# Patient Record
Sex: FEMALE | Race: BLACK OR AFRICAN AMERICAN | NOT HISPANIC OR LATINO | Employment: OTHER | URBAN - METROPOLITAN AREA
[De-identification: names, ages, dates, MRNs, and addresses within clinical notes are randomized per-mention and may not be internally consistent; named-entity substitution may affect disease eponyms.]

---

## 2017-05-03 DIAGNOSIS — M25.561 ACUTE PAIN OF RIGHT KNEE: Primary | ICD-10-CM

## 2017-05-04 ENCOUNTER — OFFICE VISIT (OUTPATIENT)
Dept: ORTHOPEDICS | Facility: CLINIC | Age: 52
End: 2017-05-04
Payer: COMMERCIAL

## 2017-05-04 ENCOUNTER — HOSPITAL ENCOUNTER (OUTPATIENT)
Dept: RADIOLOGY | Facility: HOSPITAL | Age: 52
Discharge: HOME | End: 2017-05-04
Attending: ORTHOPAEDIC SURGERY
Payer: COMMERCIAL

## 2017-05-04 VITALS
BODY MASS INDEX: 46.07 KG/M2 | HEIGHT: 61 IN | SYSTOLIC BLOOD PRESSURE: 96 MMHG | HEART RATE: 95 BPM | DIASTOLIC BLOOD PRESSURE: 50 MMHG | WEIGHT: 244 LBS

## 2017-05-04 DIAGNOSIS — M25.561 ACUTE PAIN OF RIGHT KNEE: ICD-10-CM

## 2017-05-04 DIAGNOSIS — M25.561 ACUTE PAIN OF RIGHT KNEE: Primary | ICD-10-CM

## 2017-05-04 DIAGNOSIS — R63.4 WEIGHT LOSS: Primary | ICD-10-CM

## 2017-05-04 PROCEDURE — 73562 X-RAY EXAM OF KNEE 3: CPT | Mod: 26,RT,, | Performed by: RADIOLOGY

## 2017-05-04 PROCEDURE — 99999 PR PBB SHADOW E&M-EST. PATIENT-LVL III: CPT | Mod: PBBFAC,,, | Performed by: ORTHOPAEDIC SURGERY

## 2017-05-04 PROCEDURE — 1160F RVW MEDS BY RX/DR IN RCRD: CPT | Mod: S$GLB,,, | Performed by: ORTHOPAEDIC SURGERY

## 2017-05-04 PROCEDURE — 73560 X-RAY EXAM OF KNEE 1 OR 2: CPT | Mod: TC,PO,LT

## 2017-05-04 PROCEDURE — 73560 X-RAY EXAM OF KNEE 1 OR 2: CPT | Mod: 26,LT,, | Performed by: RADIOLOGY

## 2017-05-04 PROCEDURE — 99204 OFFICE O/P NEW MOD 45 MIN: CPT | Mod: S$GLB,,, | Performed by: ORTHOPAEDIC SURGERY

## 2017-05-04 RX ORDER — FENOFIBRATE 145 MG/1
125 TABLET, FILM COATED ORAL
COMMUNITY

## 2017-05-04 RX ORDER — PANTOPRAZOLE SODIUM 40 MG/1
40 TABLET, DELAYED RELEASE ORAL
COMMUNITY

## 2017-05-04 RX ORDER — CLONAZEPAM 0.5 MG/1
0.5 TABLET ORAL
COMMUNITY
End: 2022-03-15

## 2017-05-04 RX ORDER — PREGABALIN 100 MG/1
100 CAPSULE ORAL
COMMUNITY
End: 2022-03-15

## 2017-05-04 RX ORDER — AMLODIPINE AND VALSARTAN 10; 320 MG/1; MG/1
1 TABLET ORAL
COMMUNITY
End: 2022-03-15

## 2017-05-04 RX ORDER — FUROSEMIDE 20 MG/1
20 TABLET ORAL
COMMUNITY

## 2017-05-04 RX ORDER — AMOXICILLIN 500 MG
2 CAPSULE ORAL
COMMUNITY
End: 2022-03-15

## 2017-05-04 RX ORDER — ASPIRIN 81 MG/1
81 TABLET ORAL
COMMUNITY

## 2017-05-04 RX ORDER — METOPROLOL SUCCINATE 100 MG/1
100 TABLET, EXTENDED RELEASE ORAL
COMMUNITY

## 2017-05-04 RX ORDER — ERGOCALCIFEROL 1.25 MG/1
50000 CAPSULE ORAL
COMMUNITY

## 2017-05-04 RX ORDER — NITROGLYCERIN 0.4 MG/1
0.4 TABLET SUBLINGUAL
COMMUNITY

## 2017-05-04 RX ORDER — LUBIPROSTONE 24 UG/1
24 CAPSULE ORAL
COMMUNITY

## 2017-05-04 RX ORDER — CLOPIDOGREL BISULFATE 75 MG/1
75 TABLET ORAL
COMMUNITY
End: 2022-03-15

## 2017-05-04 NOTE — LETTER
May 4, 2017      Nav Arevalo MD  [address] [facility]  [place] [address]           [facility]  [address]  [place] [address]  Phone: [phone]  Fax: [phone]          Patient: Miriam Chacon   MR Number: [id number]   YOB: 1965   Date of Visit: 5/4/2017       Dear Dr. Nav Arevalo:    Thank you for referring Miriam Chacon to me for evaluation. Attached you will find relevant portions of my assessment and plan of care.    If you have questions, please do not hesitate to call me. I look forward to following Miriam Chacon along with you.    Sincerely,    Miguel Arriaga Sr., MD    Enclosure  CC:  No Recipients    If you would like to receive this communication electronically, please contact [email or web address] or [phone] to request more information on [employer] access.    For providers and/or their staff who would like to refer a patient to [facility], please contact us through our one-stop-shop provider referral line, [facility], at [phone].    If you feel you have received this communication in error or would no longer like to receive these types of communications, please e-mail [email or web address]

## 2017-05-04 NOTE — PROGRESS NOTES
CC: This is a 51-year-old female that complains of right knee pain.    HPI: She states that she injured her right knee in 2012.  The patient underwent an ORIF of a tibial plateau fracture.  In 2015 she underwent a right total knee replacement.  The patient does have a history of heart disease.  She did suffer 1 heart attack.  The patient was on Plavix while she was involved in the first accident.  She continued the blood thinners following the total knee replacement.  She has been evaluated by vascular surgeon.    PMH:  History reviewed. No pertinent past medical history.    PSH:  History reviewed. No pertinent surgical history.    Family Hx:  History reviewed. No pertinent family history.    Allergy:  Review of patient's allergies indicates:  No Known Allergies    Medication:    Current Outpatient Prescriptions:     amlodipine-valsartan (EXFORGE)  mg per tablet, Take 1 tablet by mouth., Disp: , Rfl:     aspirin (ECOTRIN) 81 MG EC tablet, Take 81 mg by mouth., Disp: , Rfl:     clonazePAM (KLONOPIN) 0.5 MG tablet, Take 0.5 mg by mouth., Disp: , Rfl:     clopidogrel (PLAVIX) 75 mg tablet, Take 75 mg by mouth., Disp: , Rfl:     ergocalciferol (ERGOCALCIFEROL) 50,000 unit Cap, Take 50,000 Units by mouth., Disp: , Rfl:     fenofibrate (TRICOR) 145 MG tablet, Take 125 mg by mouth., Disp: , Rfl:     fish oil-omega-3 fatty acids 300-1,000 mg capsule, Take 2 g by mouth., Disp: , Rfl:     furosemide (LASIX) 20 MG tablet, Take 20 mg by mouth., Disp: , Rfl:     insulin regular 100 unit/mL Inj injection, Inject 26.25 Units into the skin., Disp: , Rfl:     lubiprostone (AMITIZA) 24 MCG Cap, Take 24 mcg by mouth., Disp: , Rfl:     metoprolol succinate (TOPROL-XL) 100 MG 24 hr tablet, Take 100 mg by mouth., Disp: , Rfl:     nitroGLYCERIN (NITROSTAT) 0.4 MG SL tablet, Place 0.4 mg under the tongue., Disp: , Rfl:     pantoprazole (PROTONIX) 40 MG tablet, Take 40 mg by mouth., Disp: , Rfl:     pregabalin (LYRICA)  "100 MG capsule, Take 100 mg by mouth., Disp: , Rfl:     Social History:    Social History     Social History    Marital status:      Spouse name: N/A    Number of children: N/A    Years of education: N/A     Occupational History    Not on file.     Social History Main Topics    Smoking status: Never Smoker    Smokeless tobacco: Never Used    Alcohol use No    Drug use: Not on file    Sexual activity: Not on file     Other Topics Concern    Not on file     Social History Narrative    No narrative on file       Vitals:   BP (!) 96/50  Pulse 95  Ht 5' 1" (1.549 m)  Wt 110.7 kg (244 lb)  BMI 46.1 kg/m2     ROS:  GENERAL: No fever, chills, fatigability or weight loss.  SKIN: No rashes, itching or changes in color or texture of skin.  HEAD: No headaches or recent head trauma.  EYES: Visual acuity fine. No photophobia, ocular pain or diplopia.  EARS: Denies ear pain, discharge or vertigo.  NOSE: No loss of smell, no epistaxis or postnasal drip.  MOUTH & THROAT: No hoarseness or change in voice. No excessive gum bleeding.  NODES: Denies swollen glands.  CHEST: Denies LEONARD, cyanosis, wheezing, cough and sputum production.  CARDIOVASCULAR: Denies chest pain, PND, orthopnea or reduced exercise tolerance.  ABDOMEN: Appetite fine. No weight loss. Denies diarrhea, abdominal pain, hematemesis or blood in stool.  URINARY: No flank pain, dysuria or hematuria.  PERIPHERAL VASCULAR: No claudication or cyanosis.  NEUROLOGIC: No history of seizures, paralysis, alteration of gait or coordination.  MUSCULOSKELETAL: See HPI    PE:  APPEARANCE: Well nourished, well developed, in no acute distress.   HEAD: Normocephalic, atraumatic.  EYES: PERRL. EOMI.   EARS: TM's intact. Light reflex normal. No retraction or perforation.   NOSE: Mucosa pink. Airway clear.  MOUTH & THROAT: No tonsillar enlargement. No pharyngeal erythema or exudate. No stridor.  NECK: Supple.   NODES: No cervical, axillary or inguinal lymph node " enlargement.  CHEST: Lungs clear to auscultation.  CARDIOVASCULAR: Normal S1, S2. No rubs, murmurs or gallops.  ABDOMEN: Bowel sounds normal. Not distended. Soft. No tenderness or masses.  NEUROLOGIC: Cranial Nerves: II-XII grossly intact, also see MUSCULOSKELETAL  MUSCULOSKELETAL:              Right Knee   -2 plus dorsalis pedis and posterior tibial artery pulses, light touch intact Right lower extremity.  All digits are warm. No erythema, no warmth, no drainage, moderate swelling, no significant tenderness.  Less than 2 seconds capillary refill all digit diffuse pitting edema  Assessment:           Diagnosis:              1.right knee pain                 Diagnostic Studies  MRI-No  X-Ray-No  EMG/NCV-No  Arthrogram-No  Bone Scan-No  CT Scan-No  Doppler-No  ESR-No  CRP-No  CBC with Diff-No   Rheumatoid/Arthritis Panel-No      Plan:                                                 1. PT-yes                                                 2.OT-no                                          3.NSAID-yes                                        4. Narcotics-no                                     5. Wound care-Yes                                 6. Rest-yes                                           7. Surgery-no                                         8. KALEIGH Hose-no                                    9. Anticoagulation therapy-no               10. Elevation-no                                     11. Crutches-no                                    12. Walker-no             13. Cane no                        14. Referral-no                                     15.Injection-no                            16. Splint   /    Cast   /   Cast Shoe-Yes  referral to bariatric            17. RICE-none            18. Follow up-  3 month

## 2017-05-05 NOTE — PATIENT INSTRUCTIONS
ACE Wrap  Minor muscle or joint injuries are often treated with an elastic bandage. The bandage provides support and compression to the injured area. An elastic bandage is a stretchy, rolled bandage. Elastic bandages range in width from 2 to 6 inches. They can be used for a variety of injuries. The bandages are often called ACE bandages, after the most common brand name.  If used correctly, elastic bandages help control swelling and ease pain. An elastic bandage is also a good reminder not to overuse the injured area. However, elastic bandages do not provide a lot of support and will not prevent reinjury.  Home care    To apply an elastic bandage:  · Check the skin before wrapping the injury. It should be clean, dry, and free of drainage.  · Start wrapping below the injury and work your way toward the body. For an ankle sprain, start wrapping around the foot and work up toward the calf. This will help control swelling.  · Overlap the edges of the bandage so it stays snuggly in place.  · Wrap the bandage firmly, but not too tightly. A tight bandage can increase swelling on either end of the bandage. Make sure the bandage is wrinkle free.  · Leave fingers and toes exposed.  · Secure ends of the bandage (even self-sticking ones) with clips or tape.  · Check frequently to ensure adequate circulation, especially in the fingers and toes. Loosen the bandage if there is local swelling, numbness, tingling, discomfort, coldness, or discoloration (skin pale or bluish in color).  · Rewrap the bandage as needed during the day. Reroll the bandage as you unwind it.  Continue using the elastic bandage until the pain and swelling are gone or as your healthcare provider advises.  If you have been told to ice the area, the ice can be secured in place with the elastic bandage. Wrap the ice pack with a thin towel to protect the skin. Do not put ice or an ice pack directly on the skin.  Ice the area for no more than 20 minutes at a  time.    Follow-up care  Follow up with your healthcare provider, as advised.  When to seek medical advice  Call your healthcare provider for any of the following:  · Pain and swelling that doesn't get better or gets worse  · Trouble moving injured area  · Skin discoloration, numbness, or tingling that doesnt go away after bandage is removed  Date Last Reviewed: 9/13/2015  © 7076-8057 [employer]. [address] [place] [address], [place] [address]. All rights reserved. This information is not intended as a substitute for professional medical care. Always follow your healthcare professional's instructions.

## 2017-06-30 ENCOUNTER — OFFICE VISIT (OUTPATIENT)
Dept: ORTHOPEDICS | Facility: CLINIC | Age: 52
End: 2017-06-30
Payer: COMMERCIAL

## 2017-06-30 VITALS
HEIGHT: 61 IN | SYSTOLIC BLOOD PRESSURE: 118 MMHG | BODY MASS INDEX: 51.73 KG/M2 | WEIGHT: 274 LBS | DIASTOLIC BLOOD PRESSURE: 70 MMHG

## 2017-06-30 DIAGNOSIS — G89.29 CHRONIC PAIN OF RIGHT KNEE: Primary | ICD-10-CM

## 2017-06-30 DIAGNOSIS — M17.12 ARTHRITIS OF LEFT KNEE: Primary | ICD-10-CM

## 2017-06-30 DIAGNOSIS — M25.462 EFFUSION OF LEFT KNEE: ICD-10-CM

## 2017-06-30 DIAGNOSIS — M65.9 SYNOVITIS OF LEFT KNEE: ICD-10-CM

## 2017-06-30 DIAGNOSIS — M25.561 CHRONIC PAIN OF RIGHT KNEE: Primary | ICD-10-CM

## 2017-06-30 PROBLEM — M65.962 SYNOVITIS OF LEFT KNEE: Status: ACTIVE | Noted: 2017-06-30

## 2017-06-30 PROCEDURE — 99214 OFFICE O/P EST MOD 30 MIN: CPT | Mod: S$GLB,,, | Performed by: ORTHOPAEDIC SURGERY

## 2017-06-30 PROCEDURE — 99999 PR PBB SHADOW E&M-EST. PATIENT-LVL III: CPT | Mod: PBBFAC,,, | Performed by: ORTHOPAEDIC SURGERY

## 2017-06-30 NOTE — PROGRESS NOTES
CC:This is a 51-year-old female that complains of left knee pain.    HPI:The patient states that she had a sudden onset of left knee pain that is rated a 7 out of 10.  She denies any trauma.The patient does have a history of right knee replacement as well as ORIF of the tibial plateau on the right.  She does favor her right lower extremity and place more weight on the left.    PMH:  No past medical history on file.    PSH:  No past surgical history on file.    Family Hx:  No family history on file.    Allergy:  Review of patient's allergies indicates:  No Known Allergies    Medication:    Current Outpatient Prescriptions:     amlodipine-valsartan (EXFORGE)  mg per tablet, Take 1 tablet by mouth., Disp: , Rfl:     aspirin (ECOTRIN) 81 MG EC tablet, Take 81 mg by mouth., Disp: , Rfl:     clonazePAM (KLONOPIN) 0.5 MG tablet, Take 0.5 mg by mouth., Disp: , Rfl:     clopidogrel (PLAVIX) 75 mg tablet, Take 75 mg by mouth., Disp: , Rfl:     ergocalciferol (ERGOCALCIFEROL) 50,000 unit Cap, Take 50,000 Units by mouth., Disp: , Rfl:     fenofibrate (TRICOR) 145 MG tablet, Take 125 mg by mouth., Disp: , Rfl:     fish oil-omega-3 fatty acids 300-1,000 mg capsule, Take 2 g by mouth., Disp: , Rfl:     furosemide (LASIX) 20 MG tablet, Take 20 mg by mouth., Disp: , Rfl:     insulin regular 100 unit/mL Inj injection, Inject 26.25 Units into the skin., Disp: , Rfl:     lubiprostone (AMITIZA) 24 MCG Cap, Take 24 mcg by mouth., Disp: , Rfl:     metoprolol succinate (TOPROL-XL) 100 MG 24 hr tablet, Take 100 mg by mouth., Disp: , Rfl:     nitroGLYCERIN (NITROSTAT) 0.4 MG SL tablet, Place 0.4 mg under the tongue., Disp: , Rfl:     pantoprazole (PROTONIX) 40 MG tablet, Take 40 mg by mouth., Disp: , Rfl:     pregabalin (LYRICA) 100 MG capsule, Take 100 mg by mouth., Disp: , Rfl:     Social History:    Social History     Social History    Marital status:      Spouse name: N/A    Number of children: N/A    Years  "of education: N/A     Occupational History    Not on file.     Social History Main Topics    Smoking status: Never Smoker    Smokeless tobacco: Never Used    Alcohol use No    Drug use: Unknown    Sexual activity: Not on file     Other Topics Concern    Not on file     Social History Narrative    No narrative on file       Vitals:   /70 (BP Location: Right arm, Patient Position: Sitting, BP Method: Automatic)   Ht 5' 1" (1.549 m)   Wt 124.3 kg (274 lb)   BMI 51.77 kg/m²      ROS:  GENERAL: No fever, chills, fatigability or weight loss.  SKIN: No rashes, itching or changes in color or texture of skin.  HEAD: No headaches or recent head trauma.  EYES: Visual acuity fine. No photophobia, ocular pain or diplopia.  EARS: Denies ear pain, discharge or vertigo.  NOSE: No loss of smell, no epistaxis or postnasal drip.  MOUTH & THROAT: No hoarseness or change in voice. No excessive gum bleeding.  NODES: Denies swollen glands.  CHEST: Denies LEONARD, cyanosis, wheezing, cough and sputum production.  CARDIOVASCULAR: Denies chest pain, PND, orthopnea or reduced exercise tolerance.  ABDOMEN: Appetite fine. No weight loss. Denies diarrhea, abdominal pain, hematemesis or blood in stool.  URINARY: No flank pain, dysuria or hematuria.  PERIPHERAL VASCULAR: No claudication or cyanosis.  NEUROLOGIC: No history of seizures, paralysis, alteration of gait or coordination.  MUSCULOSKELETAL: See HPI    PE:  APPEARANCE: Well nourished, well developed, in no acute distress.   HEAD: Normocephalic, atraumatic.  EYES: PERRL. EOMI.   EARS: TM's intact. Light reflex normal. No retraction or perforation.   NOSE: Mucosa pink. Airway clear.  MOUTH & THROAT: No tonsillar enlargement. No pharyngeal erythema or exudate. No stridor.  NECK: Supple.   NODES: No cervical, axillary or inguinal lymph node enlargement.  CHEST: Lungs clear to auscultation.  CARDIOVASCULAR: Normal S1, S2. No rubs, murmurs or gallops.  ABDOMEN: Bowel sounds normal. " Not distended. Soft. No tenderness or masses.  NEUROLOGIC: Cranial Nerves: II-XII grossly intact, also see MUSCULOSKELETAL  MUSCULOSKELETAL:          left Knee Exam-abnormal    Gait-abnormal  Muscle Appearance:abnormal  Grooming:normal  Spine Alignment-normal  Muscle Atrophy-Positive  Deformities-Negative  Tenderness-Positive  Paresthesias-Negative  Range of Motion         Ext-normal, 0 degrees         Flex-abnormal  Muscle Strength-abnormal  Sensation-normal  Reflexes-normal  Crepitus-Positive                                Swelling-Negative  Effusion- Positive                                Edema-Negative  Lachman-Negative                                Erythema-Negative  [facility]'s-Positive                              Apley Grind-Positive  Patellar Comp-Positive                         Alignment-normal/symmetric  Patellar Apprehension-Negative              Synovial fullness-Positive  Passive Patellar Tilt-normal  Patellar Tracking-normal   Patellar Glide-normal  Q-Angle at 90 degrees-normal  Patellar Grind-abnormal  [id number]  Fatigue-Negative                                     HS Tightness-Negative  Tests on Exam, No ligamentous laxity  Neurovascular Status-normal+2 DP and PT artery pulses  Skin-normal       Assessment:  The patient is a diabetic.  She understands that anti-inflammatories can cause renal failure and GI upset. I recommend the patient takes extra strength Tylenol.         Diagnosis:              1.left knee arthritis               2.  knee effusion                3.  Left knee synovitis    Diagnostic Studies  MRI-No  X-Ray-No  EMG/NCV-No  Arthrogram-No  Bone Scan-No  CT Scan-No  Doppler-No  ESR-No  CRP-No  CBC with Diff-No   Rheumatoid/Arthritis Panel-No      Plan:                                                 1. PT-yes                                                 2.OT-no                                          3.NSAID-No                                        4. Narcotics-no                                      5. Wound care-N/A                                 6. Rest-no                                           7. Surgery-no                                         8. KALEIGH Hose-no                                    9. Anticoagulation therapy-no               10. Elevation-no                                     11. Crutches-no                                    12. Walker-no             13. Cane yes                        14. Referral-no                                     15.Injection-no                            16. Splint   /    Cast   /   Cast Shoe-Yes              17. RICE-none            18. Follow up- 6 months

## 2017-06-30 NOTE — PATIENT INSTRUCTIONS
Arthritis: Exercise     Look for exercise classes for arthritis in your community.     Exercise is important to your overall health. It is especially important in people with arthritis. Regular exercise can:  · Keep your heart and blood vessels healthy  · Help with weight management, or weight loss  · Improve your mood  · Help prevent and manage health problems such as:  ¨ Diabetes  ¨ High blood pressure  ¨ High cholesterol  ¨ Depression  In people with arthritis, it offers all of those benefits and it can:  · Lessen pain and stiffness  · Strengthen muscles that support your joints  · Help you to be able to do the things you enjoy  Exercise and arthritis  Exercise is an important part of any arthritis treatment plan. A complete program consists of the following three types of exercises:  · Aerobic exercises for cardiovascular health and overall fitness.   · Strengthening exercises to build up muscles to help prevent injury and keep joints stable.  · Range-of-motion exercises to keep muscles and joints flexible.  Getting started  Talk with your healthcare provider about what is safe for you. Make sure you:  · Learn how to do exercises properly and safely. Consider talking with a physical therapist or  used to working with people with arthritis.  · Start gradually and build. If you haven't been exercising, start slowly. Don't exercise too hard or too long.  · Create a routine. Set aside specific times for exercise every day.  · Warm up carefully. Take 5 to 10 minutes at the beginning and end of exercising to warm up and cool down. Just do the same exercises at a slower pace for 5 to 10 minutes.  · Work at a comfortable, smooth pace. Move your joints gently to prevent injury.  · Pay attention to your body. Don't exercise a painful or swollen joint; switch to another activity. Follow the 2-hour pain rule: You did too much if your joint or muscle pain lasts 2 hours or more after exercising, or is worse the next  day. This doesn't mean you should stop exercising. Just do less.  Aerobic exercise  Aerobic exercise improves overall health and helps control weight. Choose those that don't add extra stress to your joints. For example, walking, swimming, or bicycling.  Most people should exercise for at least 30 minutes. most days of the week. You don't have to exercise all at once. Try exercising for 10 minutes, 3 times a day, for example.  Strengthening exercises  Strengthening your muscles help to protect your joints and prevent injuries. Try to do strengthening exercises 2 to 3 times a week:  · These exercises can be done with exercise or resistance bands (inexpensive exercise aids that add resistance), or with light weights. Some people use soup cans as weights.  · Isometric exercises are done by tightening the muscles without moving the joint. This may be a good way to strengthen the muscles around a stiff joint.  A physical therapist or  can teach you how to do these exercises.  Range-of-motion (ROM) exercises  Range-of-motion (ROM) exercises allow you to move each of your joints in every way they are intended to move. You should do ROM exercises for each joint 2 to 3 times a day. This will help you maintain full use of all of your joints.  Sample ROM exercises  The following are just a sample of ROM exercises--one for your neck, shoulders, elbows, hips, knees, and ankles. To completely move each joint through its full range of motion, you will have to do a few exercises for each joint. A physical therapist or  can teach you how to do full ROM exercises for each joint.   Repeat each for these exercises 5 to 10 times. Make sure you move slowly:  1. Neck turns. Sit in a straight-backed chair. Look straight ahead. Slowly turn your head to the right, then return it to center. Repeat. Do the same thing, turning your head to the left. Repeat.  2. Shoulder raise. Lie on your back or sit in a chair. Raise one arm over  your head, keeping your elbow straight. Keep the arm close to your ear. Return it slowly to your side. Repeat with your other arm.  3. Elbow stretch. Sit in a chair. If you are able, put both arms out to your sides to form a T. Slowly touch your shoulders with the tips of your fingers. Then return to the T-position. Repeat.  4. Hip stretch. Lie on your back with your legs straight and about 6 inches apart. With your foot flexed, slide your leg out to the side, then slide it back to the starting position. Repeat with your other leg.  5. Knee bend. Sit in a chair with your legs bent at the knees in front of you. Straighten one leg as much as you can, then bring it back to the floor. Repeat this 5 to 10 times. Then do the same thing with the other leg.   6. Ankle stretch. Sit with your feet flat on the floor. Lift your toes off of the floor while your heels stay down. Repeat. Then lift your heels off the floor while your toes stay down. Repeat.  Other exercise  Many other exercise and activities benefit people with arthritis. It is most important to find exercise and activities that you enjoy. You might try:  · Yoga, including chair yoga, helps to keep your joints strong and flexible.   · Alan Chi, an ancient type of exercise with slow, gentle movements  · Water exercise, including water walking  For more information on exercise for arthritis go to the Ar[employer] website: www.arthritis.org.  Date Last Reviewed: 2/14/2016  © 9628-9535 [employer]. [address] [place] [address], [place] [address]. All rights reserved. This information is not intended as a substitute for professional medical care. Always follow your healthcare professional's instructions.

## 2017-07-27 ENCOUNTER — TELEPHONE (OUTPATIENT)
Dept: ORTHOPEDICS | Facility: CLINIC | Age: 52
End: 2017-07-27

## 2017-07-27 NOTE — TELEPHONE ENCOUNTER
Spoke with the patient in regards to her leg pain. Pt had an appt scheduled for 8/17/17. Pt was offered to be put on the wait list just in case anyone canceled. Pt declined, she kept her 8/17 appt but stated she will go to the Er. -AS

## 2017-08-17 ENCOUNTER — OFFICE VISIT (OUTPATIENT)
Dept: ORTHOPEDICS | Facility: CLINIC | Age: 52
End: 2017-08-17
Payer: COMMERCIAL

## 2017-08-17 VITALS
DIASTOLIC BLOOD PRESSURE: 71 MMHG | SYSTOLIC BLOOD PRESSURE: 132 MMHG | WEIGHT: 274.06 LBS | RESPIRATION RATE: 18 BRPM | HEART RATE: 97 BPM | HEIGHT: 61 IN | BODY MASS INDEX: 51.74 KG/M2

## 2017-08-17 DIAGNOSIS — M17.12 ARTHRITIS OF LEFT KNEE: Primary | ICD-10-CM

## 2017-08-17 PROCEDURE — 99213 OFFICE O/P EST LOW 20 MIN: CPT | Mod: S$GLB,,, | Performed by: ORTHOPAEDIC SURGERY

## 2017-08-17 PROCEDURE — 99999 PR PBB SHADOW E&M-EST. PATIENT-LVL III: CPT | Mod: PBBFAC,,, | Performed by: ORTHOPAEDIC SURGERY

## 2017-08-17 PROCEDURE — 3008F BODY MASS INDEX DOCD: CPT | Mod: S$GLB,,, | Performed by: ORTHOPAEDIC SURGERY

## 2017-08-17 NOTE — PATIENT INSTRUCTIONS
Arthritis: Exercise     Look for exercise classes for arthritis in your community.     Exercise is important to your overall health. It is especially important in people with arthritis. Regular exercise can:  · Keep your heart and blood vessels healthy  · Help with weight management, or weight loss  · Improve your mood  · Help prevent and manage health problems such as:  ¨ Diabetes  ¨ High blood pressure  ¨ High cholesterol  ¨ Depression  In people with arthritis, it offers all of those benefits and it can:  · Lessen pain and stiffness  · Strengthen muscles that support your joints  · Help you to be able to do the things you enjoy  Exercise and arthritis  Exercise is an important part of any arthritis treatment plan. A complete program consists of the following three types of exercises:  · Aerobic exercises for cardiovascular health and overall fitness.   · Strengthening exercises to build up muscles to help prevent injury and keep joints stable.  · Range-of-motion exercises to keep muscles and joints flexible.  Getting started  Talk with your healthcare provider about what is safe for you. Make sure you:  · Learn how to do exercises properly and safely. Consider talking with a physical therapist or  used to working with people with arthritis.  · Start gradually and build. If you haven't been exercising, start slowly. Don't exercise too hard or too long.  · Create a routine. Set aside specific times for exercise every day.  · Warm up carefully. Take 5 to 10 minutes at the beginning and end of exercising to warm up and cool down. Just do the same exercises at a slower pace for 5 to 10 minutes.  · Work at a comfortable, smooth pace. Move your joints gently to prevent injury.  · Pay attention to your body. Don't exercise a painful or swollen joint; switch to another activity. Follow the 2-hour pain rule: You did too much if your joint or muscle pain lasts 2 hours or more after exercising, or is worse the next  day. This doesn't mean you should stop exercising. Just do less.  Aerobic exercise  Aerobic exercise improves overall health and helps control weight. Choose those that don't add extra stress to your joints. For example, walking, swimming, or bicycling.  Most people should exercise for at least 30 minutes. most days of the week. You don't have to exercise all at once. Try exercising for 10 minutes, 3 times a day, for example.  Strengthening exercises  Strengthening your muscles help to protect your joints and prevent injuries. Try to do strengthening exercises 2 to 3 times a week:  · These exercises can be done with exercise or resistance bands (inexpensive exercise aids that add resistance), or with light weights. Some people use soup cans as weights.  · Isometric exercises are done by tightening the muscles without moving the joint. This may be a good way to strengthen the muscles around a stiff joint.  A physical therapist or  can teach you how to do these exercises.  Range-of-motion (ROM) exercises  Range-of-motion (ROM) exercises allow you to move each of your joints in every way they are intended to move. You should do ROM exercises for each joint 2 to 3 times a day. This will help you maintain full use of all of your joints.  Sample ROM exercises  The following are just a sample of ROM exercises--one for your neck, shoulders, elbows, hips, knees, and ankles. To completely move each joint through its full range of motion, you will have to do a few exercises for each joint. A physical therapist or  can teach you how to do full ROM exercises for each joint.   Repeat each for these exercises 5 to 10 times. Make sure you move slowly:  1. Neck turns. Sit in a straight-backed chair. Look straight ahead. Slowly turn your head to the right, then return it to center. Repeat. Do the same thing, turning your head to the left. Repeat.  2. Shoulder raise. Lie on your back or sit in a chair. Raise one arm over  your head, keeping your elbow straight. Keep the arm close to your ear. Return it slowly to your side. Repeat with your other arm.  3. Elbow stretch. Sit in a chair. If you are able, put both arms out to your sides to form a T. Slowly touch your shoulders with the tips of your fingers. Then return to the T-position. Repeat.  4. Hip stretch. Lie on your back with your legs straight and about 6 inches apart. With your foot flexed, slide your leg out to the side, then slide it back to the starting position. Repeat with your other leg.  5. Knee bend. Sit in a chair with your legs bent at the knees in front of you. Straighten one leg as much as you can, then bring it back to the floor. Repeat this 5 to 10 times. Then do the same thing with the other leg.   6. Ankle stretch. Sit with your feet flat on the floor. Lift your toes off of the floor while your heels stay down. Repeat. Then lift your heels off the floor while your toes stay down. Repeat.  Other exercise  Many other exercise and activities benefit people with arthritis. It is most important to find exercise and activities that you enjoy. You might try:  · Yoga, including chair yoga, helps to keep your joints strong and flexible.   · Alan Chi, an ancient type of exercise with slow, gentle movements  · Water exercise, including water walking  For more information on exercise for arthritis go to the [employer] website: www.arthritis.org.  Date Last Reviewed: 2/14/2016  © 3277-1062 [employer]. [address] [place] [address], [place] [address]. All rights reserved. This information is not intended as a substitute for professional medical care. Always follow your healthcare professional's instructions.

## 2017-08-17 NOTE — PROGRESS NOTES
CC:This is a 51-year-old female that complains of left knee pain.    HPI:The patient states that she had a sudden onset of left knee pain that is rated a 7 out of 10.  She denies any trauma.The patient does have a history of right knee replacement as well as ORIF of the tibial plateau on the right.  She does favor her right lower extremity and place more weight on the left.    PMH:  History reviewed. No pertinent past medical history.    PSH:  History reviewed. No pertinent surgical history.    Family Hx:  History reviewed. No pertinent family history.    Allergy:  Review of patient's allergies indicates:  No Known Allergies    Medication:    Current Outpatient Prescriptions:     amlodipine-valsartan (EXFORGE)  mg per tablet, Take 1 tablet by mouth., Disp: , Rfl:     aspirin (ECOTRIN) 81 MG EC tablet, Take 81 mg by mouth., Disp: , Rfl:     clonazePAM (KLONOPIN) 0.5 MG tablet, Take 0.5 mg by mouth., Disp: , Rfl:     clopidogrel (PLAVIX) 75 mg tablet, Take 75 mg by mouth., Disp: , Rfl:     ergocalciferol (ERGOCALCIFEROL) 50,000 unit Cap, Take 50,000 Units by mouth., Disp: , Rfl:     fenofibrate (TRICOR) 145 MG tablet, Take 125 mg by mouth., Disp: , Rfl:     fish oil-omega-3 fatty acids 300-1,000 mg capsule, Take 2 g by mouth., Disp: , Rfl:     furosemide (LASIX) 20 MG tablet, Take 20 mg by mouth., Disp: , Rfl:     insulin regular 100 unit/mL Inj injection, Inject 26.25 Units into the skin., Disp: , Rfl:     lubiprostone (AMITIZA) 24 MCG Cap, Take 24 mcg by mouth., Disp: , Rfl:     metoprolol succinate (TOPROL-XL) 100 MG 24 hr tablet, Take 100 mg by mouth., Disp: , Rfl:     nitroGLYCERIN (NITROSTAT) 0.4 MG SL tablet, Place 0.4 mg under the tongue., Disp: , Rfl:     pantoprazole (PROTONIX) 40 MG tablet, Take 40 mg by mouth., Disp: , Rfl:     pregabalin (LYRICA) 100 MG capsule, Take 100 mg by mouth., Disp: , Rfl:     Social History:    Social History     Social History    Marital status:       "Spouse name: N/A    Number of children: N/A    Years of education: N/A     Occupational History    Not on file.     Social History Main Topics    Smoking status: Never Smoker    Smokeless tobacco: Never Used    Alcohol use No    Drug use: Unknown    Sexual activity: Not on file     Other Topics Concern    Not on file     Social History Narrative    No narrative on file     /71   Pulse 97   Resp 18   Ht 5' 1" (1.549 m)   Wt 124.3 kg (274 lb 0.5 oz)   BMI 51.78 kg/m²      ROS:  GENERAL: No fever, chills, fatigability or weight loss.  SKIN: No rashes, itching or changes in color or texture of skin.  HEAD: No headaches or recent head trauma.  EYES: Visual acuity fine. No photophobia, ocular pain or diplopia.  EARS: Denies ear pain, discharge or vertigo.  NOSE: No loss of smell, no epistaxis or postnasal drip.  MOUTH & THROAT: No hoarseness or change in voice. No excessive gum bleeding.  NODES: Denies swollen glands.  CHEST: Denies LEONARD, cyanosis, wheezing, cough and sputum production.  CARDIOVASCULAR: Denies chest pain, PND, orthopnea or reduced exercise tolerance.  ABDOMEN: Appetite fine. No weight loss. Denies diarrhea, abdominal pain, hematemesis or blood in stool.  URINARY: No flank pain, dysuria or hematuria.  PERIPHERAL VASCULAR: No claudication or cyanosis.  NEUROLOGIC: No history of seizures, paralysis, alteration of gait or coordination.  MUSCULOSKELETAL: See HPI    PE:  APPEARANCE: Well nourished, well developed, in no acute distress.   HEAD: Normocephalic, atraumatic.  EYES: PERRL. EOMI.   EARS: TM's intact. Light reflex normal. No retraction or perforation.   NOSE: Mucosa pink. Airway clear.  MOUTH & THROAT: No tonsillar enlargement. No pharyngeal erythema or exudate. No stridor.  NECK: Supple.   NODES: No cervical, axillary or inguinal lymph node enlargement.  CHEST: Lungs clear to auscultation.  CARDIOVASCULAR: Normal S1, S2. No rubs, murmurs or gallops.  ABDOMEN: Bowel sounds normal. " Not distended. Soft. No tenderness or masses.  NEUROLOGIC: Cranial Nerves: II-XII grossly intact, also see MUSCULOSKELETAL  MUSCULOSKELETAL:          left Knee Exam-abnormal    Gait-abnormal  Muscle Appearance:abnormal  Grooming:normal  Spine Alignment-normal  Muscle Atrophy-Positive  Deformities-Negative  Tenderness-Positive  Paresthesias-Negative  Range of Motion         Ext-normal, 0 degrees         Flex-abnormal  Muscle Strength-abnormal  Sensation-normal  Reflexes-normal  Crepitus-Positive                                Swelling-Negative  Effusion- Positive                                Edema-Negative  Lachman-Negative                                Erythema-Negative  [facility]'s-Positive                              Apley Grind-Positive  Patellar Comp-Positive                         Alignment-normal/symmetric  Patellar Apprehension-Negative              Synovial fullness-Positive  Passive Patellar Tilt-normal  Patellar Tracking-normal   Patellar Glide-normal  Q-Angle at 90 degrees-normal  Patellar Grind-abnormal  [id number]  Fatigue-Negative                                     HS Tightness-Negative  Tests on Exam, No ligamentous laxity  Neurovascular Status-normal+2 DP and PT artery pulses  Skin-normal       Assessment:  The patient is a diabetic.  She understands that anti-inflammatories can cause renal failure and GI upset. I recommend the patient takes extra strength Tylenol.         Diagnosis:              1.left knee arthritis               2.  knee effusion                3.  Left knee synovitis    Diagnostic Studies  MRI-No  X-Ray-No  EMG/NCV-No  Arthrogram-No  Bone Scan-No  CT Scan-No  Doppler-No  ESR-No  CRP-No  CBC with Diff-No   Rheumatoid/Arthritis Panel-No      Plan:                                                 1. PT-yes                                                 2.OT-no                                          3.NSAID-No                                        4. Narcotics-no                                      5. Wound care-N/A                                 6. Rest-no                                           7. Surgery-no                                         8. KALEIGH Hose-no                                    9. Anticoagulation therapy-no               10. Elevation-no                                     11. Crutches-no                                    12. Walker-no             13. Cane yes                        14. Referral-no                                     15.Injection-no                            16. Splint   /    Cast   /   Cast Shoe-Yes              17. RICE-none            18. Follow up- 12 months

## 2018-10-22 ENCOUNTER — TELEPHONE (OUTPATIENT)
Dept: ORTHOPEDICS | Facility: CLINIC | Age: 53
End: 2018-10-22

## 2018-10-22 DIAGNOSIS — M25.562 BILATERAL CHRONIC KNEE PAIN: Primary | ICD-10-CM

## 2018-10-22 DIAGNOSIS — M25.561 BILATERAL CHRONIC KNEE PAIN: Primary | ICD-10-CM

## 2018-10-22 DIAGNOSIS — G89.29 BILATERAL CHRONIC KNEE PAIN: Primary | ICD-10-CM

## 2018-11-27 ENCOUNTER — HOSPITAL ENCOUNTER (OUTPATIENT)
Dept: RADIOLOGY | Facility: HOSPITAL | Age: 53
Discharge: HOME | End: 2018-11-27
Attending: ORTHOPAEDIC SURGERY
Payer: COMMERCIAL

## 2018-11-27 ENCOUNTER — OFFICE VISIT (OUTPATIENT)
Dept: ORTHOPEDICS | Facility: CLINIC | Age: 53
End: 2018-11-27
Payer: COMMERCIAL

## 2018-11-27 VITALS
HEART RATE: 87 BPM | BODY MASS INDEX: 51.73 KG/M2 | WEIGHT: 274 LBS | RESPIRATION RATE: 18 BRPM | HEIGHT: 61 IN | SYSTOLIC BLOOD PRESSURE: 155 MMHG | DIASTOLIC BLOOD PRESSURE: 75 MMHG

## 2018-11-27 DIAGNOSIS — G89.29 BILATERAL CHRONIC KNEE PAIN: ICD-10-CM

## 2018-11-27 DIAGNOSIS — M17.12 ARTHRITIS OF LEFT KNEE: Primary | ICD-10-CM

## 2018-11-27 DIAGNOSIS — M25.562 BILATERAL CHRONIC KNEE PAIN: ICD-10-CM

## 2018-11-27 DIAGNOSIS — M25.561 BILATERAL CHRONIC KNEE PAIN: ICD-10-CM

## 2018-11-27 PROCEDURE — 99999 PR PBB SHADOW E&M-EST. PATIENT-LVL III: CPT | Mod: PBBFAC,,, | Performed by: ORTHOPAEDIC SURGERY

## 2018-11-27 PROCEDURE — 73562 X-RAY EXAM OF KNEE 3: CPT | Mod: 26,50,, | Performed by: RADIOLOGY

## 2018-11-27 PROCEDURE — 3008F BODY MASS INDEX DOCD: CPT | Mod: CPTII,S$GLB,, | Performed by: ORTHOPAEDIC SURGERY

## 2018-11-27 PROCEDURE — 99213 OFFICE O/P EST LOW 20 MIN: CPT | Mod: S$GLB,,, | Performed by: ORTHOPAEDIC SURGERY

## 2018-11-27 PROCEDURE — 73562 X-RAY EXAM OF KNEE 3: CPT | Mod: TC,50,FY,PO

## 2018-11-27 RX ORDER — NABUMETONE 500 MG/1
500 TABLET, FILM COATED ORAL DAILY
Qty: 30 TABLET | Refills: 2 | Status: SHIPPED | OUTPATIENT
Start: 2018-11-27 | End: 2022-03-15

## 2018-11-27 NOTE — PROGRESS NOTES
CC:This is a 51-year-old female that complains of left knee pain.    HPI:The patient states that she had a sudden onset of left knee pain that is rated a 7 out of 10.  She denies any trauma.The patient does have a history of right knee replacement as well as ORIF of the tibial plateau on the right.  She does favor her right lower extremity and place more weight on the left.    PMH:  No past medical history on file.    PSH:  No past surgical history on file.    Family Hx:  No family history on file.    Allergy:  Review of patient's allergies indicates:  No Known Allergies    Medication:    Current Outpatient Medications:     amlodipine-valsartan (EXFORGE)  mg per tablet, Take 1 tablet by mouth., Disp: , Rfl:     aspirin (ECOTRIN) 81 MG EC tablet, Take 81 mg by mouth., Disp: , Rfl:     clonazePAM (KLONOPIN) 0.5 MG tablet, Take 0.5 mg by mouth., Disp: , Rfl:     clopidogrel (PLAVIX) 75 mg tablet, Take 75 mg by mouth., Disp: , Rfl:     ergocalciferol (ERGOCALCIFEROL) 50,000 unit Cap, Take 50,000 Units by mouth., Disp: , Rfl:     fenofibrate (TRICOR) 145 MG tablet, Take 125 mg by mouth., Disp: , Rfl:     furosemide (LASIX) 20 MG tablet, Take 20 mg by mouth., Disp: , Rfl:     insulin regular 100 unit/mL Inj injection, Inject 26.25 Units into the skin., Disp: , Rfl:     lubiprostone (AMITIZA) 24 MCG Cap, Take 24 mcg by mouth., Disp: , Rfl:     metoprolol succinate (TOPROL-XL) 100 MG 24 hr tablet, Take 100 mg by mouth., Disp: , Rfl:     nitroGLYCERIN (NITROSTAT) 0.4 MG SL tablet, Place 0.4 mg under the tongue., Disp: , Rfl:     pantoprazole (PROTONIX) 40 MG tablet, Take 40 mg by mouth., Disp: , Rfl:     pregabalin (LYRICA) 100 MG capsule, Take 100 mg by mouth., Disp: , Rfl:     fish oil-omega-3 fatty acids 300-1,000 mg capsule, Take 2 g by mouth., Disp: , Rfl:     Social History:    Social History     Socioeconomic History    Marital status:      Spouse name: Not on file    Number of children:  "Not on file    Years of education: Not on file    Highest education level: Not on file   Social Needs    Financial resource strain: Not on file    Food insecurity - worry: Not on file    Food insecurity - inability: Not on file    Transportation needs - medical: Not on file    Transportation needs - non-medical: Not on file   Occupational History    Not on file   Tobacco Use    Smoking status: Never Smoker    Smokeless tobacco: Never Used   Substance and Sexual Activity    Alcohol use: No    Drug use: Not on file    Sexual activity: Not on file   Other Topics Concern    Not on file   Social History Narrative    Not on file     BP (!) 155/75   Pulse 87   Resp 18   Ht 5' 1" (1.549 m)   Wt 124.3 kg (274 lb)   BMI 51.77 kg/m²      ROS:  GENERAL: No fever, chills, fatigability or weight loss.  SKIN: No rashes, itching or changes in color or texture of skin.  HEAD: No headaches or recent head trauma.  EYES: Visual acuity fine. No photophobia, ocular pain or diplopia.  EARS: Denies ear pain, discharge or vertigo.  NOSE: No loss of smell, no epistaxis or postnasal drip.  MOUTH & THROAT: No hoarseness or change in voice. No excessive gum bleeding.  NODES: Denies swollen glands.  CHEST: Denies LEONARD, cyanosis, wheezing, cough and sputum production.  CARDIOVASCULAR: Denies chest pain, PND, orthopnea or reduced exercise tolerance.  ABDOMEN: Appetite fine. No weight loss. Denies diarrhea, abdominal pain, hematemesis or blood in stool.  URINARY: No flank pain, dysuria or hematuria.  PERIPHERAL VASCULAR: No claudication or cyanosis.  NEUROLOGIC: No history of seizures, paralysis, alteration of gait or coordination.  MUSCULOSKELETAL: See HPI    PE:  APPEARANCE: Well nourished, well developed, in no acute distress.   HEAD: Normocephalic, atraumatic.  EYES: PERRL. EOMI.   EARS: TM's intact. Light reflex normal. No retraction or perforation.   NOSE: Mucosa pink. Airway clear.  MOUTH & THROAT: No tonsillar " enlargement. No pharyngeal erythema or exudate. No stridor.  NECK: Supple.   NODES: No cervical, axillary or inguinal lymph node enlargement.  CHEST: Lungs clear to auscultation.  CARDIOVASCULAR: Normal S1, S2. No rubs, murmurs or gallops.  ABDOMEN: Bowel sounds normal. Not distended. Soft. No tenderness or masses.  NEUROLOGIC: Cranial Nerves: II-XII grossly intact, also see MUSCULOSKELETAL  MUSCULOSKELETAL:          left Knee Exam-abnormal    Gait-abnormal  Muscle Appearance:abnormal  Grooming:normal  Spine Alignment-normal  Muscle Atrophy-Positive  Deformities-Negative  Tenderness-Positive  Paresthesias-Negative  Range of Motion         Ext-normal, 0 degrees         Flex-abnormal  Muscle Strength-abnormal  Sensation-normal  Reflexes-normal  Crepitus-Positive                                Swelling-Negative  Effusion- Positive                                Edema-Negative  Lachman-Negative                                Erythema-Negative  Freddie's-Positive                              Apley Grind-Positive  Patellar Comp-Positive                         Alignment-normal/symmetric  Patellar Apprehension-Negative              Synovial fullness-Positive  Passive Patellar Tilt-normal  Patellar Tracking-normal   Patellar Glide-normal  Q-Angle at 90 degrees-normal  Patellar Grind-abnormal  T-[id number]-Ipsaqscf  Fatigue-Negative                                     HS Tightness-Negative  Tests on Exam, No ligamentous laxity  Neurovascular Status-normal+2 DP and PT artery pulses  Skin-normal       Assessment:  The patient is a diabetic.  She understands that anti-inflammatories can cause renal failure and GI upset. I recommend the patient takes extra strength Tylenol.         Diagnosis:              1.left knee arthritis               2.  knee effusion                3.  Left knee synovitis    Diagnostic Studies  MRI-No  X-Ray-No  EMG/NCV-No  Arthrogram-No  Bone Scan-No  CT Scan-No  Doppler-No  ESR-No  CRP-No  CBC with  Diff-No   Rheumatoid/Arthritis Panel-No      Plan:                                                 1. PT-yes                                                 2.OT-no                                          3.NSAID-No                                        4. Narcotics-no                                     5. Wound care-N/A                                 6. Rest-no                                           7. Surgery-no                                         8. KALEIGH Hose-no                                    9. Anticoagulation therapy-no               10. Elevation-no                                     11. Crutches-no                                    12. Walker-no             13. Cane yes                        14. Referral-no                                     15.Injection-no                            16. Splint   /    Cast   /   Cast Shoe-Yes              17. RICE-none            18. Follow up- 12 months

## 2018-12-13 NOTE — PATIENT INSTRUCTIONS
Arthritis: Exercise     Look for exercise classes for arthritis in your community.     Exercise is important to your overall health. It is especially important in people with arthritis. Regular exercise can:  · Keep your heart and blood vessels healthy  · Help with weight management, or weight loss  · Improve your mood  · Help prevent and manage health problems such as:  ¨ Diabetes  ¨ High blood pressure  ¨ High cholesterol  ¨ Depression  In people with arthritis, it offers all of those benefits and it can:  · Lessen pain and stiffness  · Strengthen muscles that support your joints  · Help you to be able to do the things you enjoy  Exercise and arthritis  Exercise is an important part of any arthritis treatment plan. A complete program consists of the following three types of exercises:  · Aerobic exercises for cardiovascular health and overall fitness.   · Strengthening exercises to build up muscles to help prevent injury and keep joints stable.  · Range-of-motion exercises to keep muscles and joints flexible.  Getting started  Talk with your healthcare provider about what is safe for you. Make sure you:  · Learn how to do exercises properly and safely. Consider talking with a physical therapist or  used to working with people with arthritis.  · Start gradually and build. If you haven't been exercising, start slowly. Don't exercise too hard or too long.  · Create a routine. Set aside specific times for exercise every day.  · Warm up carefully. Take 5 to 10 minutes at the beginning and end of exercising to warm up and cool down. Just do the same exercises at a slower pace for 5 to 10 minutes.  · Work at a comfortable, smooth pace. Move your joints gently to prevent injury.  · Pay attention to your body. Don't exercise a painful or swollen joint; switch to another activity. Follow the 2-hour pain rule: You did too much if your joint or muscle pain lasts 2 hours or more after exercising, or is worse the next  day. This doesn't mean you should stop exercising. Just do less.  Aerobic exercise  Aerobic exercise improves overall health and helps control weight. Choose those that don't add extra stress to your joints. For example, walking, swimming, or bicycling.  Most people should exercise for at least 30 minutes. most days of the week. You don't have to exercise all at once. Try exercising for 10 minutes, 3 times a day, for example.  Strengthening exercises  Strengthening your muscles help to protect your joints and prevent injuries. Try to do strengthening exercises 2 to 3 times a week:  · These exercises can be done with exercise or resistance bands (inexpensive exercise aids that add resistance), or with light weights. Some people use soup cans as weights.  · Isometric exercises are done by tightening the muscles without moving the joint. This may be a good way to strengthen the muscles around a stiff joint.  A physical therapist or  can teach you how to do these exercises.  Range-of-motion (ROM) exercises  Range-of-motion (ROM) exercises allow you to move each of your joints in every way they are intended to move. You should do ROM exercises for each joint 2 to 3 times a day. This will help you maintain full use of all of your joints.  Sample ROM exercises  The following are just a sample of ROM exercises--one for your neck, shoulders, elbows, hips, knees, and ankles. To completely move each joint through its full range of motion, you will have to do a few exercises for each joint. A physical therapist or  can teach you how to do full ROM exercises for each joint.   Repeat each for these exercises 5 to 10 times. Make sure you move slowly:  1. Neck turns. Sit in a straight-backed chair. Look straight ahead. Slowly turn your head to the right, then return it to center. Repeat. Do the same thing, turning your head to the left. Repeat.  2. Shoulder raise. Lie on your back or sit in a chair. Raise one arm over  your head, keeping your elbow straight. Keep the arm close to your ear. Return it slowly to your side. Repeat with your other arm.  3. Elbow stretch. Sit in a chair. If you are able, put both arms out to your sides to form a T. Slowly touch your shoulders with the tips of your fingers. Then return to the T-position. Repeat.  4. Hip stretch. Lie on your back with your legs straight and about 6 inches apart. With your foot flexed, slide your leg out to the side, then slide it back to the starting position. Repeat with your other leg.  5. Knee bend. Sit in a chair with your legs bent at the knees in front of you. Straighten one leg as much as you can, then bring it back to the floor. Repeat this 5 to 10 times. Then do the same thing with the other leg.   6. Ankle stretch. Sit with your feet flat on the floor. Lift your toes off of the floor while your heels stay down. Repeat. Then lift your heels off the floor while your toes stay down. Repeat.  Other exercise  Many other exercise and activities benefit people with arthritis. It is most important to find exercise and activities that you enjoy. You might try:  · Yoga, including chair yoga, helps to keep your joints strong and flexible.   · Alan Chi, an ancient type of exercise with slow, gentle movements  · Water exercise, including water walking  For more information on exercise for arthritis go to the [employer] website: www.arthritis.org.  Date Last Reviewed: 2/14/2016  © 1808-0473 [employer]. [address] [place] [address], [place] [address]. All rights reserved. This information is not intended as a substitute for professional medical care. Always follow your healthcare professional's instructions.

## 2018-12-20 ENCOUNTER — TELEPHONE (OUTPATIENT)
Dept: ORTHOPEDICS | Facility: CLINIC | Age: 53
End: 2018-12-20

## 2018-12-20 NOTE — TELEPHONE ENCOUNTER
Spoke with patient to get rescheduled because  is no longer working at [facility]. I asked the patients permission to reschedule them with one of our other providers here at [facility]. Patient agreed to see one of our other provider. Patient will be rescheduled at this time. I also informed the patient that we will be at the new location at this time.. The appointment and the new address will be mailed out to the patient..SJ

## 2019-05-30 ENCOUNTER — TELEPHONE (OUTPATIENT)
Dept: ORTHOPEDICS | Facility: CLINIC | Age: 54
End: 2019-05-30

## 2019-05-30 NOTE — TELEPHONE ENCOUNTER
Called the patient to see if they wanted to come in sooner than their 11:30 appointment. Patient states that they forgot that they had an appointment and that they would like to reschedule. I got the patient reschedule to see Jorge Luis on 7/18/19 at 1:40. Patient verbalized understanding. Patient was thankful for the call.FP

## 2019-07-18 ENCOUNTER — TELEPHONE (OUTPATIENT)
Dept: ORTHOPEDICS | Facility: CLINIC | Age: 54
End: 2019-07-18

## 2019-07-18 DIAGNOSIS — M25.562 PAIN IN BOTH KNEES, UNSPECIFIED CHRONICITY: Primary | ICD-10-CM

## 2019-07-18 DIAGNOSIS — M25.561 PAIN IN BOTH KNEES, UNSPECIFIED CHRONICITY: Primary | ICD-10-CM

## 2019-07-18 NOTE — TELEPHONE ENCOUNTER
Called the patient about their appointment today with Jorge Luis Grider. Called to see if they where still coming or if they need to reschedule. No answer no voice mail.PARTH

## 2021-11-16 ENCOUNTER — HOSPITAL ENCOUNTER (EMERGENCY)
Facility: HOSPITAL | Age: 56
Discharge: HOME | End: 2021-11-16
Attending: EMERGENCY MEDICINE
Payer: MEDICAID

## 2021-11-16 VITALS
BODY MASS INDEX: 46.33 KG/M2 | SYSTOLIC BLOOD PRESSURE: 172 MMHG | DIASTOLIC BLOOD PRESSURE: 89 MMHG | WEIGHT: 236 LBS | RESPIRATION RATE: 17 BRPM | OXYGEN SATURATION: 96 % | TEMPERATURE: 98 F | HEART RATE: 92 BPM | HEIGHT: 60 IN

## 2021-11-16 DIAGNOSIS — M25.562 LEFT KNEE PAIN: ICD-10-CM

## 2021-11-16 PROCEDURE — 99283 EMERGENCY DEPT VISIT LOW MDM: CPT | Mod: 25

## 2021-11-17 ENCOUNTER — TELEPHONE (OUTPATIENT)
Dept: ORTHOPEDICS | Facility: CLINIC | Age: 56
End: 2021-11-17
Payer: MEDICAID

## 2022-03-15 ENCOUNTER — OFFICE VISIT (OUTPATIENT)
Dept: ORTHOPEDICS | Facility: CLINIC | Age: 57
End: 2022-03-15
Payer: MEDICAID

## 2022-03-15 VITALS — BODY MASS INDEX: 46.33 KG/M2 | HEIGHT: 60 IN | WEIGHT: 236 LBS

## 2022-03-15 DIAGNOSIS — Z96.651 PAIN DUE TO TOTAL RIGHT KNEE REPLACEMENT, SEQUELA: Primary | ICD-10-CM

## 2022-03-15 DIAGNOSIS — T84.84XS PAIN DUE TO TOTAL RIGHT KNEE REPLACEMENT, SEQUELA: Primary | ICD-10-CM

## 2022-03-15 PROCEDURE — 1160F PR REVIEW ALL MEDS BY PRESCRIBER/CLIN PHARMACIST DOCUMENTED: ICD-10-PCS | Mod: CPTII,,, | Performed by: ORTHOPAEDIC SURGERY

## 2022-03-15 PROCEDURE — 99204 PR OFFICE/OUTPT VISIT, NEW, LEVL IV, 45-59 MIN: ICD-10-PCS | Mod: S$PBB,,, | Performed by: ORTHOPAEDIC SURGERY

## 2022-03-15 PROCEDURE — 1159F PR MEDICATION LIST DOCUMENTED IN MEDICAL RECORD: ICD-10-PCS | Mod: CPTII,,, | Performed by: ORTHOPAEDIC SURGERY

## 2022-03-15 PROCEDURE — 1159F MED LIST DOCD IN RCRD: CPT | Mod: CPTII,,, | Performed by: ORTHOPAEDIC SURGERY

## 2022-03-15 PROCEDURE — 99214 OFFICE O/P EST MOD 30 MIN: CPT | Mod: PBBFAC,PN | Performed by: ORTHOPAEDIC SURGERY

## 2022-03-15 PROCEDURE — 3008F BODY MASS INDEX DOCD: CPT | Mod: CPTII,,, | Performed by: ORTHOPAEDIC SURGERY

## 2022-03-15 PROCEDURE — 99999 PR PBB SHADOW E&M-EST. PATIENT-LVL IV: ICD-10-PCS | Mod: PBBFAC,,, | Performed by: ORTHOPAEDIC SURGERY

## 2022-03-15 PROCEDURE — 99204 OFFICE O/P NEW MOD 45 MIN: CPT | Mod: S$PBB,,, | Performed by: ORTHOPAEDIC SURGERY

## 2022-03-15 PROCEDURE — 99999 PR PBB SHADOW E&M-EST. PATIENT-LVL IV: CPT | Mod: PBBFAC,,, | Performed by: ORTHOPAEDIC SURGERY

## 2022-03-15 PROCEDURE — 3008F PR BODY MASS INDEX (BMI) DOCUMENTED: ICD-10-PCS | Mod: CPTII,,, | Performed by: ORTHOPAEDIC SURGERY

## 2022-03-15 PROCEDURE — 1160F RVW MEDS BY RX/DR IN RCRD: CPT | Mod: CPTII,,, | Performed by: ORTHOPAEDIC SURGERY

## 2022-03-15 RX ORDER — DOCUSATE SODIUM 100 MG/1
100 CAPSULE, LIQUID FILLED ORAL 3 TIMES DAILY
COMMUNITY
Start: 2021-12-22

## 2022-03-15 RX ORDER — METHOCARBAMOL 500 MG/1
TABLET, FILM COATED ORAL
COMMUNITY
Start: 2021-12-22

## 2022-03-15 RX ORDER — CARVEDILOL 25 MG/1
25 TABLET ORAL 2 TIMES DAILY
COMMUNITY
Start: 2022-02-09

## 2022-03-15 RX ORDER — LINACLOTIDE 290 UG/1
290 CAPSULE, GELATIN COATED ORAL DAILY
COMMUNITY
Start: 2021-12-22

## 2022-03-15 RX ORDER — PEN NEEDLE, DIABETIC 32GX 5/32"
NEEDLE, DISPOSABLE MISCELLANEOUS 3 TIMES DAILY
COMMUNITY
Start: 2022-03-03

## 2022-03-15 RX ORDER — LANCETS 33 GAUGE
EACH MISCELLANEOUS
COMMUNITY
Start: 2022-01-19

## 2022-03-15 RX ORDER — ASPIRIN 325 MG
TABLET, DELAYED RELEASE (ENTERIC COATED) ORAL
COMMUNITY
Start: 2021-12-22

## 2022-03-15 RX ORDER — FERROUS SULFATE 325(65) MG
325 TABLET ORAL
COMMUNITY

## 2022-03-15 RX ORDER — GABAPENTIN 400 MG/1
400 CAPSULE ORAL 3 TIMES DAILY
Qty: 90 CAPSULE | Refills: 5 | Status: SHIPPED | OUTPATIENT
Start: 2022-03-15 | End: 2022-06-13

## 2022-03-15 RX ORDER — ONDANSETRON 4 MG/1
TABLET, ORALLY DISINTEGRATING ORAL
COMMUNITY
Start: 2021-12-22

## 2022-03-15 RX ORDER — BUTALBITAL, ACETAMINOPHEN AND CAFFEINE 300; 40; 50 MG/1; MG/1; MG/1
1 CAPSULE ORAL EVERY 8 HOURS PRN
COMMUNITY
Start: 2022-02-21

## 2022-03-15 RX ORDER — INSULIN GLARGINE 100 [IU]/ML
INJECTION, SOLUTION SUBCUTANEOUS
COMMUNITY
Start: 2022-02-15

## 2022-03-15 RX ORDER — METOCLOPRAMIDE 10 MG/1
TABLET ORAL
COMMUNITY
Start: 2022-02-21

## 2022-03-15 RX ORDER — ICOSAPENT ETHYL 1000 MG/1
1 CAPSULE ORAL 2 TIMES DAILY
COMMUNITY
Start: 2022-03-07

## 2022-03-15 RX ORDER — NIFEDIPINE 60 MG/1
60 TABLET, EXTENDED RELEASE ORAL DAILY
COMMUNITY
Start: 2022-03-07

## 2022-03-15 RX ORDER — ATORVASTATIN CALCIUM 80 MG/1
80 TABLET, FILM COATED ORAL DAILY
COMMUNITY
Start: 2022-01-26

## 2022-03-15 RX ORDER — TICAGRELOR 90 MG/1
TABLET ORAL
COMMUNITY
Start: 2022-02-25

## 2022-03-15 RX ORDER — INSULIN LISPRO 100 [IU]/ML
INJECTION, SOLUTION INTRAVENOUS; SUBCUTANEOUS
COMMUNITY
Start: 2022-02-15

## 2022-03-15 RX ORDER — HYDROXYZINE PAMOATE 50 MG/1
50 CAPSULE ORAL
COMMUNITY

## 2022-03-15 RX ORDER — FINERENONE 10 MG/1
2 TABLET, FILM COATED ORAL DAILY
COMMUNITY
Start: 2022-02-15

## 2022-03-15 RX ORDER — AMLODIPINE BESYLATE 10 MG/1
10 TABLET ORAL DAILY
COMMUNITY
Start: 2022-02-02

## 2022-03-15 RX ORDER — DULOXETIN HYDROCHLORIDE 60 MG/1
60 CAPSULE, DELAYED RELEASE ORAL DAILY
COMMUNITY
Start: 2022-02-21

## 2022-03-15 RX ORDER — TIZANIDINE 4 MG/1
4 TABLET ORAL EVERY 6 HOURS PRN
COMMUNITY

## 2022-03-15 RX ORDER — TRAMADOL HYDROCHLORIDE 50 MG/1
50 TABLET ORAL EVERY 6 HOURS PRN
COMMUNITY
Start: 2022-03-09

## 2022-03-15 NOTE — PROGRESS NOTES
Subjective:      Patient ID: Miriam Chacon is a 56 y.o. female.    Chief Complaint:  Pain in both knees  HPI     They have experienced problems with their left knee over the past 8 years. The patient reports relevant history of injury/aggravation.  She reportedly fell on stairs at her apartment complex.  She had surgical fixation of a right tibia fracture, followed by right knee replacement.  The patient reports chronic pain and stiffness ever since.  She uses a walker at home and a wheelchair to go out.  She receives no current treatment but reports being on long-term [place] from her orthopedic doctor at home.  She also complains of some left knee pain.    Review of Systems   Constitutional: Negative for fever and weight loss.   HENT: Negative for congestion.    Eyes: Negative for visual disturbance.   Cardiovascular: Negative for chest pain.   Respiratory: Negative for shortness of breath.    Hematologic/Lymphatic: Negative for bleeding problem. Does not bruise/bleed easily.   Skin: Negative for poor wound healing.   Musculoskeletal: Positive for joint pain.   Gastrointestinal: Negative for abdominal pain.   Genitourinary: Negative for dysuria.   Neurological: Negative for seizures.   Psychiatric/Behavioral: Negative for altered mental status.   Allergic/Immunologic: Negative for persistent infections.         Objective:      Ortho/SPM Exam        Right knee    [unfilled]    The patient is not in acute distress.   Sclerae normal  Body habitus is obese.  Respiratory distress:  none   The patient walks with a limp.  Hip irritability  negative.   The skin over the knee is has a healed scar.  Knee effusion 0  Palpation- no focal tenderness  Range of motion- 20-70  Ligament laxity exam:  Trace valgus laxity   Patellar apprehension negative.  Popliteal cyst negative  Patellar crepitation absent.  Meniscal irritability not applicable  Pulses DP present, PT present.  Motor normal 5/5 strength in all tested muscle groups.    Sensory normal.     Right knee  Nontender  No effusion  Full range of motion  Stable to valgus and varus stress    I reviewed the relevant imaging for the patient's condition:  Recent films of the left knee show no fracture or dislocation.  No localized bone lesion.  Joint space is maintained.  Radiographs from several years ago of the right knee show a total knee replacement in place with reverse slope of the tibial component.  There is extensive hardware in the tibial shaft which is not completely visualized.    Assessment:       Encounter Diagnosis   Name Primary?    Pain due to total right knee replacement, sequela Yes          The function of the right total knee is much less than typically expected, but given the patient's previous injury, it is likely the best that could have been achieved.    Any attempted reconstruction would be a very extensive procedure that given the patient's comorbidity, would be at high risk for failure due to infection, medical complications or recurrent scarring.  As such, the risks are not justified by the likely complications.        Plan:       Miriam was seen today for pain and swelling.    Diagnoses and all orders for this visit:    Pain due to total right knee replacement, sequela      I explained my diagnostic impression and the reasoning behind it in detail, using layman's terms.  Models and/or pictures were used to help in the explanation.    Gabapentin-usual instructions    Avoidance of narcotics recommended

## 2025-06-26 ENCOUNTER — TELEPHONE (OUTPATIENT)
Dept: INFECTIOUS DISEASES | Facility: CLINIC | Age: 60
End: 2025-06-26
Payer: MEDICARE

## 2025-06-26 NOTE — TELEPHONE ENCOUNTER
Copied from CRM #[id number]. Topic: Appointments - Appointment Scheduling  >> Jun 26, 2025  1:03 PM Ivis wrote:  Appt scheduled

## 2025-09-05 ENCOUNTER — OFFICE VISIT (OUTPATIENT)
Dept: INFECTIOUS DISEASES | Facility: CLINIC | Age: 60
End: 2025-09-05
Payer: MEDICARE

## 2025-09-05 VITALS — SYSTOLIC BLOOD PRESSURE: 102 MMHG | DIASTOLIC BLOOD PRESSURE: 54 MMHG | HEART RATE: 95 BPM

## 2025-09-05 DIAGNOSIS — E78.5 HYPERLIPIDEMIA, UNSPECIFIED HYPERLIPIDEMIA TYPE: ICD-10-CM

## 2025-09-05 DIAGNOSIS — N18.6 ESRD ON DIALYSIS: ICD-10-CM

## 2025-09-05 DIAGNOSIS — L73.2 HIDRADENITIS: ICD-10-CM

## 2025-09-05 DIAGNOSIS — L03.90 RECURRENT CELLULITIS: Primary | ICD-10-CM

## 2025-09-05 DIAGNOSIS — Z99.2 ESRD ON DIALYSIS: ICD-10-CM

## 2025-09-05 DIAGNOSIS — I10 HYPERTENSION, UNSPECIFIED TYPE: ICD-10-CM

## 2025-09-05 DIAGNOSIS — E11.69 TYPE 2 DIABETES MELLITUS WITH OTHER SPECIFIED COMPLICATION, UNSPECIFIED WHETHER LONG TERM INSULIN USE: ICD-10-CM

## 2025-09-05 PROCEDURE — 99999 PR PBB SHADOW E&M-EST. PATIENT-LVL IV: CPT | Mod: PBBFAC,,, | Performed by: STUDENT IN AN ORGANIZED HEALTH CARE EDUCATION/TRAINING PROGRAM

## 2025-09-05 RX ORDER — MUPIROCIN 20 MG/G
OINTMENT TOPICAL 2 TIMES DAILY
Qty: 30 G | Refills: 3 | Status: SHIPPED | OUTPATIENT
Start: 2025-09-05 | End: 2025-09-15